# Patient Record
Sex: FEMALE | Race: WHITE | NOT HISPANIC OR LATINO | Employment: OTHER | URBAN - NONMETROPOLITAN AREA
[De-identification: names, ages, dates, MRNs, and addresses within clinical notes are randomized per-mention and may not be internally consistent; named-entity substitution may affect disease eponyms.]

---

## 2022-07-21 ENCOUNTER — OFFICE VISIT (OUTPATIENT)
Dept: NEUROLOGY | Facility: CLINIC | Age: 67
End: 2022-07-21

## 2022-07-21 VITALS
OXYGEN SATURATION: 94 % | TEMPERATURE: 97.8 F | HEART RATE: 69 BPM | DIASTOLIC BLOOD PRESSURE: 80 MMHG | HEIGHT: 64 IN | SYSTOLIC BLOOD PRESSURE: 122 MMHG

## 2022-07-21 DIAGNOSIS — R47.89 WORD FINDING PROBLEM: Primary | ICD-10-CM

## 2022-07-21 PROCEDURE — 99203 OFFICE O/P NEW LOW 30 MIN: CPT | Performed by: NURSE PRACTITIONER

## 2022-07-21 RX ORDER — TROPICAMIDE 10 MG/ML
SOLUTION/ DROPS OPHTHALMIC
COMMUNITY

## 2022-07-21 RX ORDER — LEVOCETIRIZINE DIHYDROCHLORIDE 5 MG/1
5 TABLET, FILM COATED ORAL DAILY
COMMUNITY
Start: 2022-07-14

## 2022-07-21 RX ORDER — GUAIFENESIN 200 MG/1
400 TABLET ORAL EVERY 4 HOURS PRN
COMMUNITY

## 2022-07-21 RX ORDER — AZELASTINE 1 MG/ML
SPRAY, METERED NASAL
COMMUNITY
Start: 2022-06-11

## 2022-07-21 RX ORDER — DIPHENHYDRAMINE HCL 25 MG
25 CAPSULE ORAL EVERY 6 HOURS PRN
COMMUNITY

## 2022-07-21 RX ORDER — OMEGA-3S/DHA/EPA/FISH OIL/D3 300MG-1000
400 CAPSULE ORAL DAILY
COMMUNITY

## 2022-07-21 RX ORDER — CETIRIZINE HYDROCHLORIDE 10 MG/1
TABLET ORAL
COMMUNITY

## 2022-07-21 RX ORDER — CYCLOSPORINE 0.5 MG/ML
EMULSION OPHTHALMIC
COMMUNITY
Start: 2022-06-01

## 2022-07-21 RX ORDER — ALBUTEROL SULFATE 90 UG/1
AEROSOL, METERED RESPIRATORY (INHALATION)
COMMUNITY
Start: 2022-06-10

## 2022-07-21 RX ORDER — PHENYLEPHRINE HCL 2.5 %
DROPS OPHTHALMIC (EYE)
COMMUNITY

## 2022-07-21 RX ORDER — MONTELUKAST SODIUM 10 MG/1
10 TABLET ORAL DAILY
COMMUNITY
Start: 2022-06-11

## 2022-07-21 RX ORDER — ATORVASTATIN CALCIUM 10 MG/1
10 TABLET, FILM COATED ORAL
COMMUNITY
Start: 2022-04-18

## 2022-07-21 RX ORDER — ASPIRIN 81 MG/1
81 TABLET ORAL DAILY
COMMUNITY

## 2022-07-21 RX ORDER — LISINOPRIL 5 MG/1
5 TABLET ORAL
COMMUNITY
Start: 2022-07-13

## 2022-07-21 NOTE — PROGRESS NOTES
New Neurology Patient Office Visit      Patient Name: Elaina Stafford    Referring Physician: Barrera Dominguez PA*    Chief Complaint:    Chief Complaint   Patient presents with   • Advice Only     Pt in office to establish care for History of traumatic brain injury       History of Present Illness: Elaina Stafford is a 66 y.o. female who is here today to establish care with Neurology. She was referred for evaluation of reported speech difficulty after head injury.     'I had to drop out of school in 2014 because I couldn't write the papers I needed to'  She reports fall with resulting head injury to left forehead in 2013. She describes fall as tripping over luggage and striking head on edge of dresser. She did not lose consciousness with fall, 'I saw stars',  did not seek medical care at the time. She felt that after this she could not keep up with workload of school.     She admits that she did have learning trouble in high school, never diagnosed with a learning disability. She states that 'she (mother) didn't have time for me', felt that 'I had to work 10x harder than everyone else' to keep up.  Was able to complete high school successfully.    She is active with farming and physical activity, former veterinary tech. Occasionally hikes.     The following portions of the patient's history were reviewed and updated as appropriate: allergies, current medications, past family history, past medical history, past social history, past surgical history and problem list.    Subjective      Review of Systems:   Review of Systems   Respiratory: Positive for apnea and choking.    Musculoskeletal: Positive for myalgias.   Allergic/Immunologic: Positive for environmental allergies and food allergies.   Psychiatric/Behavioral: Positive for sleep disturbance.   All other systems reviewed and are negative.    Past Medical History:   Past Medical History:   Diagnosis Date   • Allergies    • Anxiety    • Arthritis    • Asthma     • Bladder problem    • Fractures    • Hypertension    • Kidney stones    • Shingles    • Sleep apnea      Past Surgical History:   Past Surgical History:   Procedure Laterality Date   • BLADDER STONE REMOVAL     • OVARIAN CYST REMOVAL     • STOMACH SURGERY       Family History:   Family History   Problem Relation Age of Onset   • Arthritis Mother    • Heart disease Mother    • Hypertension Mother    • Kidney disease Mother    • Obesity Mother    • Stroke Mother    • Alzheimer's disease Father    • Dementia Father    • Obesity Sister    • Cancer Maternal Aunt      Social History:   Social History     Socioeconomic History   • Marital status: Single   Tobacco Use   • Smoking status: Never Smoker   • Smokeless tobacco: Never Used   Substance and Sexual Activity   • Alcohol use: Never   • Drug use: Never     Medications:     Current Outpatient Medications:   •  albuterol sulfate  (90 Base) MCG/ACT inhaler, INHALE 1-2 PUFF USING INHALER EVERY FOUR TO SIX HOURS AS NEEDED, Disp: , Rfl:   •  aspirin 81 MG EC tablet, Take 81 mg by mouth Daily., Disp: , Rfl:   •  atorvastatin (LIPITOR) 10 MG tablet, Take 10 mg by mouth every night at bedtime., Disp: , Rfl:   •  azelastine (ASTELIN) 0.1 % nasal spray, SPRAY 2 SPRAYS INTO EACH NOSTRIL TWICE A DAY, Disp: , Rfl:   •  cetirizine (zyrTEC) 10 MG tablet, cetirizine 10 mg tablet, Disp: , Rfl:   •  cholecalciferol (VITAMIN D3) 10 MCG (400 UNIT) tablet, Take 400 Units by mouth Daily., Disp: , Rfl:   •  diphenhydrAMINE (BENADRYL) 25 mg capsule, Take 25 mg by mouth Every 6 (Six) Hours As Needed for Itching., Disp: , Rfl:   •  Fluticasone Furoate-Vilanterol (BREO ELLIPTA) 200-25 MCG/INH inhaler, Breo Ellipta 200 mcg-25 mcg/dose powder for inhalation, Disp: , Rfl:   •  guaiFENesin 200 MG tablet, Take 400 mg by mouth Every 4 (Four) Hours As Needed for Cough., Disp: , Rfl:   •  levocetirizine (XYZAL) 5 MG tablet, Take 5 mg by mouth Daily., Disp: , Rfl:   •  lisinopril  "(PRINIVIL,ZESTRIL) 5 MG tablet, Take 5 mg by mouth every night at bedtime., Disp: , Rfl:   •  montelukast (SINGULAIR) 10 MG tablet, Take 10 mg by mouth Daily., Disp: , Rfl:   •  phenylephrine (MYDFRIN) 2.5 % ophthalmic solution, phenylephrine 2.5 % eye drops  Apply 1 drop by ophthalmic route., Disp: , Rfl:   •  Restasis 0.05 % ophthalmic emulsion, INSTILL 1 DROP INTO BOTH EYES TWICE A DAY, Disp: , Rfl:   •  Trelegy Ellipta 200-62.5-25 MCG/INH inhaler, INHALE 1 PUFF AS DIRECTED ONCE A DAY RINSE MOUTH AFTER USE, Disp: , Rfl:   •  tropicamide (MYDRIACYL) 1 % ophthalmic solution, tropicamide 1 % eye drops  Apply 1 drop by ophthalmic route., Disp: , Rfl:   •  Vitamin A 3 MG (50624 UT) capsule, Take 10,000 Units by mouth Daily., Disp: , Rfl:     Allergies:   Allergies   Allergen Reactions   • Dalton Hives   • Other Other (See Comments)     meat   • Vanilla Hives     Objective     Physical Exam:  Vital Signs:   Vitals:    07/21/22 1102   BP: 122/80   BP Location: Left arm   Patient Position: Sitting   Cuff Size: Adult   Pulse: 69   Temp: 97.8 °F (36.6 °C)   SpO2: 94%   Height: 162.6 cm (64\")   PainSc: 0-No pain     Physical Exam  Neurological:      Mental Status: She is oriented to person, place, and time.      Coordination: Romberg Test normal.      Gait: Gait is intact. Tandem walk normal.      Deep Tendon Reflexes: Strength normal.   Psychiatric:         Speech: Speech normal.       Neurologic Exam     Mental Status   Oriented to person, place, and time.   Recall at 5 minutes: recalls 2 of 3 objects.   Attention: normal. Concentration: normal.   Speech: speech is normal   Level of consciousness: alert  Able to perform simple calculations (3/5).   Able to name object. Able to read. Able to repeat. Able to write. Normal comprehension.     MMSE 27/30       Cranial Nerves   Cranial nerves II through XII intact.     Motor Exam   Muscle bulk: normal  Overall muscle tone: normal  Right arm pronator drift: absent  Left arm " pronator drift: absent    Strength   Strength 5/5 throughout.     Sensory Exam   Light touch normal.     Gait, Coordination, and Reflexes     Gait  Gait: normal    Coordination   Romberg: negative  Tandem walking coordination: normal    Tremor   Resting tremor: absent    Reflexes   Reflexes 2+ except as noted.      Results Review:   I reviewed the patient's new clinical results.  I have reviewed the patient's other medical records to include, labs, radiology and referrals.     Assessment / Plan      Assessment/Plan:   Diagnoses and all orders for this visit:    1. Word finding problem (Primary)     Patient is a very pleasant 67 yo woman who presents today with complaint of word finding difficulty. Her speech is fluent and appropriate throughout appointment today, it appears her difficulty is more consistent with baseline learning difficulties vs injury or neurodegenerative process. I offered referral to speech therapy, she declined at this time. I have encouraged her to return if she has any new or worsening symptoms.     Follow Up:   Return if symptoms worsen or fail to improve.     [facility]   AS THE PROVIDER, I PERSONALLY WORE PPE DURING ENTIRE FACE TO FACE ENCOUNTER IN CLINIC WITH THE PATIENT. PATIENT ALSO WORE PPE DURING ENTIRE FACE TO FACE ENCOUNTER EXCEPT FOR A MAX OF 30 SECONDS DURING NEUROLOGICAL EVALUATION OF CRANIAL NERVES AND THEN MASK WAS PLACED BACK OVER PATIENT FACE FOR REMAINDER OF VISIT. I WASHED MY HANDS BEFORE AND AFTER VISIT.    Please note that portions of this note may have been completed with a voice recognition program. Efforts were made to edit the dictations, but occasionally words are mistranscribed.